# Patient Record
Sex: MALE | Race: WHITE
[De-identification: names, ages, dates, MRNs, and addresses within clinical notes are randomized per-mention and may not be internally consistent; named-entity substitution may affect disease eponyms.]

---

## 2019-01-13 ENCOUNTER — HOSPITAL ENCOUNTER (EMERGENCY)
Dept: HOSPITAL 75 - ER | Age: 19
Discharge: HOME | End: 2019-01-13
Payer: SELF-PAY

## 2019-01-13 VITALS — BODY MASS INDEX: 32.93 KG/M2 | HEIGHT: 70 IN | WEIGHT: 230 LBS

## 2019-01-13 VITALS — DIASTOLIC BLOOD PRESSURE: 78 MMHG | SYSTOLIC BLOOD PRESSURE: 130 MMHG
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
## 2019-01-13 DIAGNOSIS — F17.200: ICD-10-CM
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
## 2019-01-13 DIAGNOSIS — S92.142A: Primary | ICD-10-CM

## 2019-01-13 PROCEDURE — 73610 X-RAY EXAM OF ANKLE: CPT

## 2019-01-13 PROCEDURE — 29515 APPLICATION SHORT LEG SPLINT: CPT

## 2019-01-13 NOTE — DIAGNOSTIC IMAGING REPORT
EXAM: ANKLE, LEFT, 3 VIEWS



INDICATION: Left ankle pain and injury.



COMPARISON: None.



FINDINGS: Mildly displaced fracture of the lateral talar dome. No

other fractures. Left ankle joint effusion. Ankle mortise appears

intact.



IMPRESSION: 

1. Mildly displaced fracture of the lateral talar dome.

2. Left ankle joint effusion.



Dictated by: 



  Dictated on workstation # [id number]

## 2019-01-13 NOTE — ED LOWER EXTREMITY
General


Chief Complaint:  Lower Extremity


Stated Complaint:  L ANKLE INJ


Nursing Triage Note:  


pt presents to ed with complaints of l ankle pain/injury after jumping a fence 


and landing wrong aprox 30 pta.


Nursing Sepsis Screen:  No Definite Risk


Source:  patient


Exam Limitations:  no limitations





History of Present Illness


Date Seen by Provider:  2019


Time Seen by Provider:  17:02


Initial Comments


To ER with left lateral ankle pain and swelling and only the ability to bear 

partial weight on the left foot. This began 30 minutes prior to arrival when he 

jumped a fence and landed wrong. Pain is over the lateral ankle.


Onset:  just prior to arrival


Severity:  moderate


Pain/Injury Location:  left ankle


Method of Injury:  fell


Modifying Factors:  Worse With Movement





Allergies and Home Medications


Allergies


Coded Allergies:  


     No Known Drug Allergies (Unverified , 19)





Home Medications


Hydrocodone/Acetaminophen 1 Each Tablet, 1 EACH PO Q4H PRN for PAIN-MODERATE


   Prescribed by: THUY HOLLAND on 19 [id number]





Patient Home Medication List


Home Medication List Reviewed:  Yes





Review of Systems


Constitutional:  see HPI


EENTM:  see HPI


Respiratory:  no symptoms reported


Cardiovascular:  no symptoms reported


Genitourinary:  no symptoms reported


Musculoskeletal:  see HPI


Skin:  no symptoms reported


Psychiatric/Neurological:  No Symptoms Reported





Past Medical-Social-Family Hx


Patient Social History


Alcohol Use:  Denies Use


Recreational Drug Use:  No


Smoking Status:  Current Everyday Smoker


Recent Foreign Travel:  No


Contact w/Someone Who Travel:  No


Recent Infectious Disease Expo:  No


Recent Hopitalizations:  No


Physical Abuse:  No


Sexual Abuse:  No


Mistreated:  No


Fear:  No





Seasonal Allergies


Seasonal Allergies:  No





Past Medical History


Surgeries:  No


Respiratory:  No


Cardiac:  No


Neurological:  No


Genitourinary:  No


Gastrointestinal:  No


Musculoskeletal:  No


Endocrine:  No


HEENT:  No


Cancer:  No


Psychosocial:  No


Integumentary:  No


Blood Disorders:  No





Physical Exam


Vital Signs





Vital Signs - First Documented








 19





 16:46


 


Temp 98.3


 


Pulse 83


 


Resp 20


 


B/P (MAP) 122/77 (92)


 


Pulse Ox 96





Capillary Refill : Less Than 3 Seconds


Height, Weight, BMI


Height: 5'10.00"


Weight: 230lbs. oz. 104.638283bh;  BMI


Method:Stated


General Appearance:  WD/WN, no apparent distress


HEENT:  PERRL/EOMI, normal ENT inspection


Neck:  non-tender, full range of motion


Respiratory:  no respiratory distress, no accessory muscle use


Hips:  bilateral hip non-tender, bilateral hip normal inspection, bilateral hip 

normal range of motion


Legs:  bilateral leg non-tender, bilateral leg normal inspection, bilateral leg 

normal range of motion


Knees:  bilateral knee non-tender, bilateral knee normal inspection, bilateral 

knee normal range of motion


Ankles:  right ankle non-tender, right ankle normal inspection; bilateral ankle 

normal range of motion; left ankle pain, left ankle soft tissue tenderness, 

left ankle swelling, left ankle other (foot is warm with brisk capillary refill 

of the toes, palpable dorsalis pedis pulse.)


Neurologic/Psychiatric:  alert, normal mood/affect, oriented x 3


Skin:  normal color, warm/dry





Progress/Results/Core Measures


Results/Orders


My Orders





Orders - THUY HOLLAND


Ankle, Left, 3 Views (19 16:59)


Hydrocodone/Apap 5/325 Tablet (Lortab 5 (19 17:00)





Medications Given in ED





Current Medications








 Medications  Dose


 Ordered  Sig/Ric


 Route  Start Time


 Stop Time Status Last Admin


Dose Admin


 


 Acetaminophen/


 Hydrocodone Bitart  1 tab  ONCE  ONCE


 PO  19 17:00


 19 17:01 DC 19 17:10


1 TAB








Vital Signs/I&O











 19





 16:46


 


Temp 98.3


 


Pulse 83


 


Resp 20


 


B/P (MAP) 122/77 (92)


 


Pulse Ox 96














Blood Pressure Mean:  92











Diagnostic Imaging





Comments


NAME:   VAHIDROSAN D


MED REC#:   [id number]


ACCOUNT#:   [id number]


PT STATUS:   REG ER


:   2000


PHYSICIAN:   THUY HOLLAND


ADMIT DATE:   19/ER


 ***Draft***


Date of Exam:19





ANKLE, LEFT, 3 VIEWS








EXAM: ANKLE, LEFT, 3 VIEWS





INDICATION: Left ankle pain and injury.





COMPARISON: None.





FINDINGS: Mildly displaced fracture of the lateral talar dome. No


other fractures. Left ankle joint effusion. Ankle mortise appears


intact.





IMPRESSION: 


1. Mildly displaced fracture of the lateral talar dome.


2. Left ankle joint effusion.





  Dictated on workstation # [id number]








Dict:   19


Trans:   19


[facility] [id number]





Interpreted by:     ALETHA BUNN MD


Electronically signed by:





Departure


Communication (Admissions)


 placed in a posterior short leg splint using 5 inch Ortho-Glass.





Impression





 Primary Impression:  


 Fracture, talus closed


 Qualified Codes:  S92.102A - Unspecified fracture of left talus, initial 

encounter for closed fracture


Disposition:  01 HOME, SELF-CARE


Condition:  Stable





Departure-Patient Inst.


Decision time for Depature:  17:17


Referrals:  


SHADY HAINES DPM, JOSEPH M DO GRANTHAM, JONATHAN MD IPSEN,MAYA SALAS MD





NO,LOCAL PHYSICIAN (PCP)


Primary Care Physician








RACHEAL AVILA MD, MICHAEL P MD


Patient Instructions:  Ankle Fracture (DC)





Add. Discharge Instructions:  


1. Elevate the ankle as much as possible as well with swelling and subsequent 

pain. Take the pain medication as needed. Wear the splint at all times. Do not 

remove this for showering and keep it dry for this will mean placing a trash 

bag over the lower leg and taping it so as to keep the splint dry. Call an 

orthopedic surgeon of your choosing tomorrow to make an appointment to be seen 

for follow-up. Do not put any weight on the left ankle. Use the crutches


Scripts


Hydrocodone/Acetaminophen (Norco 5-325 Tablet) 1 Each Tablet


1 EACH PO Q4H PRN for PAIN-MODERATE MDD 10, #30 TAB


   Prov: THUY HOLLAND         19


Work/School Note:  Work Release Form   Date Seen in the Emergency Department:  

2019


   Return to Work:  2019











THUY HOLLAND 2019 17:03

## 2019-01-14 ENCOUNTER — HOSPITAL ENCOUNTER (EMERGENCY)
Dept: HOSPITAL 75 - ER | Age: 19
Discharge: LEFT BEFORE DISCHARGE | End: 2019-01-14
Payer: SELF-PAY
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
## 2019-01-14 DIAGNOSIS — S99.929A: Primary | ICD-10-CM
